# Patient Record
Sex: FEMALE | Race: WHITE | NOT HISPANIC OR LATINO | Employment: OTHER | URBAN - METROPOLITAN AREA
[De-identification: names, ages, dates, MRNs, and addresses within clinical notes are randomized per-mention and may not be internally consistent; named-entity substitution may affect disease eponyms.]

---

## 2020-01-15 ENCOUNTER — TELEPHONE (OUTPATIENT)
Dept: UROLOGY | Facility: CLINIC | Age: 60
End: 2020-01-15

## 2020-01-15 NOTE — TELEPHONE ENCOUNTER
appt moved up due to kidney stone and pain. Informed to go to [facility] if anything happens between now and then. HM

## 2020-01-15 NOTE — TELEPHONE ENCOUNTER
----- Message from Sandy Mike sent at 1/15/2020 10:30 AM CST -----  Contact: pt-  Caller is requesting a call back in regards to the nurse pt is a new pt has some questions about Kidney stones. Please call back [phone]

## 2020-01-27 ENCOUNTER — OFFICE VISIT (OUTPATIENT)
Dept: UROLOGY | Facility: CLINIC | Age: 60
End: 2020-01-27
Payer: COMMERCIAL

## 2020-01-27 VITALS
SYSTOLIC BLOOD PRESSURE: 126 MMHG | BODY MASS INDEX: 25.11 KG/M2 | DIASTOLIC BLOOD PRESSURE: 76 MMHG | HEART RATE: 103 BPM | RESPIRATION RATE: 12 BRPM | WEIGHT: 160 LBS | HEIGHT: 67 IN

## 2020-01-27 DIAGNOSIS — N20.1 URETEROLITHIASIS: Primary | ICD-10-CM

## 2020-01-27 PROCEDURE — 3008F BODY MASS INDEX DOCD: CPT | Mod: CPTII,S$GLB,, | Performed by: SPECIALIST

## 2020-01-27 PROCEDURE — 99204 OFFICE O/P NEW MOD 45 MIN: CPT | Mod: S$GLB,,, | Performed by: SPECIALIST

## 2020-01-27 PROCEDURE — 99204 PR OFFICE/OUTPT VISIT, NEW, LEVL IV, 45-59 MIN: ICD-10-PCS | Mod: S$GLB,,, | Performed by: SPECIALIST

## 2020-01-27 PROCEDURE — 3008F PR BODY MASS INDEX (BMI) DOCUMENTED: ICD-10-PCS | Mod: CPTII,S$GLB,, | Performed by: SPECIALIST

## 2020-01-28 ENCOUNTER — OUTSIDE PLACE OF SERVICE (OUTPATIENT)
Dept: UROLOGY | Facility: CLINIC | Age: 60
End: 2020-01-28
Payer: COMMERCIAL

## 2020-01-28 DIAGNOSIS — Z46.6 ENCOUNTER FOR REMOVAL OF URETERAL STENT: Primary | ICD-10-CM

## 2020-01-28 LAB
APPEARANCE, UA: CLEAR
APTT PPP: 33.1 SEC (ref 23.6–36.4)
BACTERIA SPEC CULT: ABNORMAL /HPF
BILIRUB UR QL STRIP: NEGATIVE MG/DL
BILIRUB UR QL STRIP: POSITIVE
BUN SERPL-MCNC: 21 MG/DL (ref 7–18)
BUN/CREAT SERPL: 21.42 RATIO (ref 7–18)
CALCIUM OXALATE CRYSTALS, UA: ABNORMAL /LPF
CALCIUM SERPL-MCNC: 9.5 MG/DL (ref 8.8–10.5)
CHLORIDE SERPL-SCNC: 103 MMOL/L (ref 100–108)
CO2 SERPL-SCNC: 29 MMOL/L (ref 21–32)
COLOR UR: ABNORMAL
CREAT SERPL-MCNC: 0.98 MG/DL (ref 0.55–1.02)
ERYTHROCYTE [DISTWIDTH] IN BLOOD BY AUTOMATED COUNT: 12.1 % (ref 12.5–18)
GFR ESTIMATION: 58
GLUCOSE (UA): NORMAL MG/DL
GLUCOSE SERPL-MCNC: 111 MG/DL (ref 70–110)
GLUCOSE UR QL STRIP: NEGATIVE
HCT VFR BLD AUTO: 39.6 % (ref 37–47)
HGB BLD-MCNC: 13.3 G/DL (ref 12–16)
HGB UR QL STRIP: 150 /UL
INR PPP: 1 INR (ref 0.9–1.1)
KETONES UR QL STRIP: ABNORMAL MG/DL
KETONES UR QL STRIP: POSITIVE
LEUKOCYTE ESTERASE UR QL STRIP: 100 /UL
LEUKOCYTE ESTERASE UR QL STRIP: POSITIVE
MANUAL NRBC PER 100 CELLS: 0 %
MCH RBC QN AUTO: 32.6 PG (ref 27–31.2)
MCHC RBC AUTO-ENTMCNC: 33.6 G/DL (ref 31.8–35.4)
MCV RBC AUTO: 97.1 FL (ref 80–97)
MUCUS URINE: ABNORMAL /LPF
NITRITE UR QL STRIP: NEGATIVE
PH UR STRIP: 5 PH (ref 5–9)
PH, POC UA: 7
PLATELETS: 460 10*3/UL (ref 142–424)
POC AMORP, URINE: ABNORMAL
POC BACTI, URINE: ABNORMAL
POC BLOOD, URINE: POSITIVE
POC CASTS, URINE: ABNORMAL
POC CRYST, URINE: ABNORMAL
POC EPITH, URINE: ABNORMAL
POC HCG, URINE: ABNORMAL
POC HYALIN, URINE: ABNORMAL
POC MUCUS, URINE: ABNORMAL
POC NITRATES, URINE: NEGATIVE
POC OTHER, URINE: ABNORMAL
POC RBC, URINE: ABNORMAL HPF
POC WBC, URINE: ABNORMAL HPF
POTASSIUM SERPL-SCNC: 3.9 MMOL/L (ref 3.6–5.2)
PROT UR QL STRIP: ABNORMAL MG/DL
PROT UR QL STRIP: NEGATIVE
PROTHROMBIN TIME: 11.6 SEC (ref 10.6–13)
RBC # BLD AUTO: 4.08 10*6/UL (ref 4.2–5.4)
RBC #/AREA URNS HPF: ABNORMAL /HPF (ref 0–2)
SERVICE COMMENT 03: ABNORMAL
SODIUM BLD-SCNC: 142 MMOL/L (ref 135–145)
SP GR UR STRIP: 1.01 (ref 1–1.03)
SP GR UR STRIP: 1.02 (ref 1–1.03)
SPECIMEN COLLECTION METHOD, URINE: ABNORMAL
SQUAMOUS EPITHELIAL, UA: ABNORMAL /LPF
UROBILINOGEN UR STRIP-ACNC: 1 (ref 0.1–1.1)
UROBILINOGEN UR STRIP-ACNC: NORMAL MG/DL
WBC # BLD: 8.4 10*3/UL (ref 4.6–10.2)
WBC #/AREA URNS HPF: ABNORMAL /HPF (ref 0–5)

## 2020-01-28 PROCEDURE — 52356 CYSTO/URETERO W/LITHOTRIPSY: CPT | Mod: LT,,, | Performed by: SPECIALIST

## 2020-01-28 PROCEDURE — 74420 UROGRAPHY RTRGR +-KUB: CPT | Mod: 26,,, | Performed by: SPECIALIST

## 2020-01-28 PROCEDURE — 74420 PR  X-RAY RETROGRADE PYELOGRAM: ICD-10-PCS | Mod: 26,,, | Performed by: SPECIALIST

## 2020-01-28 PROCEDURE — 52356 PR CYSTO/URETERO W/LITHOTRIPSY: ICD-10-PCS | Mod: LT,,, | Performed by: SPECIALIST

## 2020-01-28 NOTE — PROGRESS NOTES
Subjective:       Patient ID: Anna Flores is a 59 y.o. female.    Chief Complaint: Nephrolithiasis (was seen at [facility] ED/ct done/new pt) and Other (co abd low left pain, nausea/unable to update medications)      HPI:  59-year-old female who was recently diagnosed with an obstructing left distal ureteral stone    This patient was in her usual state of health and she started experiencing left flank pain.  Described the pain as a twisting ache with occasional pins and needles component to it it is intermittent but when he comes on is really severe scale of 1-10 about a 7 that prompted her to seek care at a local emergency department.  A CT scan was performed that confirmed a 4-5 mm stone in left distal ureter.  She was discharged on medical therapy and has not passed the stone as the pain comes on intermittently.    Associated symptoms include some nausea 1 episode of emesis prior to going to the emergency department all of that has seemed to resolve now denies any blood in the urine her denies any fevers or chills or dysuria.    She has a history of diverticulitis so she was imaged in the emergency department with IV contrast looking for any acute flare in her diverticulitis which was not the case.  She was referred to us for further management.    Past Medical History:   Past Medical History:   Diagnosis Date    Chronic constipation     Diverticulosis     GERD (gastroesophageal reflux disease)     IBS (irritable bowel syndrome)     Kidney stone     Thyroid disease        Past Surgical Historical:   Past Surgical History:   Procedure Laterality Date     SECTION      CHOLECYSTECTOMY      HYSTERECTOMY      joint fusion in back      RETINAL DETACHMENT REPAIR W/ SCLERAL BUCKLE LE      ROTATOR CUFF REPAIR      bi lat    THYROID SURGERY      TONSILLECTOMY, ADENOIDECTOMY          Medications:      Past Social History:   Social History     Socioeconomic History    Marital status:      Spouse  name: Not on file    Number of children: Not on file    Years of education: Not on file    Highest education level: Not on file   Occupational History    Not on file   Social Needs    Financial resource strain: Not on file    Food insecurity:     Worry: Not on file     Inability: Not on file    Transportation needs:     Medical: Not on file     Non-medical: Not on file   Tobacco Use    Smoking status: Current Every Day Smoker   Substance and Sexual Activity    Alcohol use: Yes     Frequency: Monthly or less    Drug use: Not on file    Sexual activity: Not Currently   Lifestyle    Physical activity:     Days per week: Not on file     Minutes per session: Not on file    Stress: Not on file   Relationships    Social connections:     Talks on phone: Not on file     Gets together: Not on file     Attends [employer] service: Not on file     Active member of club or organization: Not on file     Attends meetings of clubs or organizations: Not on file     Relationship status: Not on file   Other Topics Concern    Not on file   Social History Narrative    Not on file       Allergies: Review of patient's allergies indicates:  No Known Allergies     Family History: History reviewed. No pertinent family history.     Review of Systems:  Review of Systems - General ROS: negative  Psychological ROS: negative  Ophthalmic ROS: negative  ENT ROS: negative  Allergy and Immunology ROS: negative  Hematological and Lymphatic ROS: negative  Endocrine ROS: negative  Respiratory ROS: no cough, shortness of breath, or wheezing  Cardiovascular ROS: no chest pain or dyspnea on exertion  Gastrointestinal ROS: no abdominal pain, change in bowel habits, or black or bloody stools  Genito-Urinary ROS: positive for - left-sided flank pain  Musculoskeletal ROS: negative  Neurological ROS: no TIA or stroke symptoms  Dermatological ROS: negative     Physical Exam:  General Appearance:    Alert, cooperative, no distress, appears stated  age   Head:    Normocephalic, without obvious abnormality, atraumatic   Eyes:    PERRL, conjunctiva/corneas clear, EOM's intact, fundi     benign, both eyes   Ears:    Normal TM's and external ear canals, both ears   Nose:   Nares normal, septum midline, mucosa normal, no drainage    or sinus tenderness   Throat:   Lips, mucosa, and tongue normal; teeth and gums normal   Neck:   Supple, symmetrical, trachea midline, no adenopathy;     thyroid:  no enlargement/tenderness/nodules; no carotid    bruit or JVD   Back:     Symmetric, no curvature, ROM normal, no CVA tenderness   Lungs:     Clear to auscultation bilaterally, respirations unlabored   Chest Wall:    No tenderness or deformity    Heart:    Regular rate and rhythm, S1 and S2 normal, no murmur, rub   or gallop   Breast Exam:    No tenderness, masses, or nipple abnormality   Abdomen:     Soft, non-tender, bowel sounds active all four quadrants,     no masses, no organomegaly, there is discomfort with deep palpation in the left lower quadrant.  No guarding no rebound tenderness.   Genitalia:    Deferred   Rectal:    Deferred   Extremities:   Extremities normal, atraumatic, no cyanosis or edema   Pulses:   2+ and symmetric all extremities   Skin:   Skin color, texture, turgor normal, no rashes or lesions   Lymph nodes:   Cervical, supraclavicular, and axillary nodes normal   Neurologic:   CNII-XII intact, normal strength, sensation and reflexes     throughout         Assessment/Plan:       59-year-old female with a newly diagnosed obstructing stone in the left distal ureter.  Patient is clearly very symptomatic.    1.  We talked about treatment alternatives including continued medical expectant therapy which he is currently undergoing.  Other options would include ureteroscopy with stone extraction.  Patient has elected to undergo ureteroscopy with stone extraction which will be done tomorrow 01/28/2020.  He has  2.  Details of the procedure were discussed with  the patient's perioperative expectations were set.  Informed consent was obtained in the clinic today    Problem List Items Addressed This Visit        Renal/    Ureterolithiasis - Primary    Relevant Orders    POCT Urinalysis (w/Micro Option)    Ambulatory Referral to External Surgery

## 2020-02-02 LAB
CALCULI COMPOSITION: NORMAL
CALCULI DESCRIPTION: NORMAL
CALCULI MASS: 36 MG
CALCULI NUMBER: 5
CALCULI PDF: NORMAL
CALCULI SIZE: NORMAL MM

## 2020-02-07 ENCOUNTER — PROCEDURE VISIT (OUTPATIENT)
Dept: UROLOGY | Facility: CLINIC | Age: 60
End: 2020-02-07
Payer: COMMERCIAL

## 2020-02-07 VITALS
RESPIRATION RATE: 18 BRPM | HEIGHT: 67 IN | OXYGEN SATURATION: 100 % | BODY MASS INDEX: 25.43 KG/M2 | HEART RATE: 71 BPM | WEIGHT: 162 LBS | DIASTOLIC BLOOD PRESSURE: 73 MMHG | SYSTOLIC BLOOD PRESSURE: 121 MMHG

## 2020-02-07 DIAGNOSIS — Z46.6 ENCOUNTER FOR REMOVAL OF URETERAL STENT: Primary | ICD-10-CM

## 2020-02-07 DIAGNOSIS — N20.1 URETEROLITHIASIS: ICD-10-CM

## 2020-02-07 PROCEDURE — 52310 CYSTOSCOPY: ICD-10-PCS | Mod: S$GLB,,, | Performed by: SPECIALIST

## 2020-02-07 PROCEDURE — 52310 CYSTOSCOPY AND TREATMENT: CPT | Mod: S$GLB,,, | Performed by: SPECIALIST

## 2020-02-07 RX ORDER — DIAZEPAM 10 MG/1
10 TABLET ORAL
Status: COMPLETED | OUTPATIENT
Start: 2020-02-07 | End: 2020-02-07

## 2020-02-07 RX ADMIN — DIAZEPAM 10 MG: 10 TABLET ORAL at 10:02

## 2020-02-07 NOTE — PATIENT INSTRUCTIONS

## 2020-02-07 NOTE — PROCEDURES
"Cystoscopy  Date/Time: 2/7/2020 11:00 AM  Performed by: Guero Ortiz MD  Authorized by: Guero Ortiz MD     Consent Done?:  Yes (Written)  Time out: Immediately prior to procedure a "time out" was called to verify the correct patient, procedure, equipment, support staff and site/side marked as required.    Position:  Dorsal lithotomy  Anesthesia:  Intraurethral instillation  Preparation: Patient was prepped and draped in usual sterile fashion      Scope type:  Rigid cystoscope  Stent removed: Yes    Stent encrusted: No    External exam normal: Yes    Urethra normal: Yes  Bladder neck normal: Bladder neck normal   Bladder normal: Yes      Patient tolerance:  Patient tolerated the procedure well with no immediate complications     Stent successfully removed patient tolerated procedure very well.    Return to clinic in 6-8 weeks for stone analysis discussion.      "

## 2023-10-31 ENCOUNTER — TELEPHONE (OUTPATIENT)
Dept: GASTROENTEROLOGY | Facility: CLINIC | Age: 63
End: 2023-10-31
Payer: COMMERCIAL

## 2023-10-31 DIAGNOSIS — K58.9 IBS (IRRITABLE BOWEL SYNDROME): ICD-10-CM

## 2023-10-31 DIAGNOSIS — K57.90 DIVERTICULOSIS: Primary | ICD-10-CM

## 2023-10-31 DIAGNOSIS — R10.9 ABDOMINAL PAIN: ICD-10-CM

## 2023-10-31 NOTE — TELEPHONE ENCOUNTER
Per referral patient is seeing gastroenterologist in [place].  Okay to schedule next available office visit with NP.  BRENNEN

## 2023-11-06 ENCOUNTER — OFFICE VISIT (OUTPATIENT)
Dept: GASTROENTEROLOGY | Facility: CLINIC | Age: 63
End: 2023-11-06
Payer: COMMERCIAL

## 2023-11-06 VITALS
BODY MASS INDEX: 26.84 KG/M2 | HEIGHT: 67 IN | OXYGEN SATURATION: 97 % | WEIGHT: 171 LBS | SYSTOLIC BLOOD PRESSURE: 120 MMHG | DIASTOLIC BLOOD PRESSURE: 79 MMHG | HEART RATE: 86 BPM

## 2023-11-06 DIAGNOSIS — R10.9 ABDOMINAL PAIN: ICD-10-CM

## 2023-11-06 DIAGNOSIS — K58.1 IRRITABLE BOWEL SYNDROME WITH CONSTIPATION: ICD-10-CM

## 2023-11-06 DIAGNOSIS — K58.9 IBS (IRRITABLE BOWEL SYNDROME): ICD-10-CM

## 2023-11-06 DIAGNOSIS — R10.13 EPIGASTRIC PAIN: Primary | ICD-10-CM

## 2023-11-06 DIAGNOSIS — K76.0 FATTY LIVER: ICD-10-CM

## 2023-11-06 DIAGNOSIS — K31.A0 GASTRIC INTESTINAL METAPLASIA: ICD-10-CM

## 2023-11-06 DIAGNOSIS — K21.9 GASTROESOPHAGEAL REFLUX DISEASE, UNSPECIFIED WHETHER ESOPHAGITIS PRESENT: ICD-10-CM

## 2023-11-06 DIAGNOSIS — R14.0 ABDOMINAL BLOATING: ICD-10-CM

## 2023-11-06 DIAGNOSIS — K57.90 DIVERTICULOSIS: ICD-10-CM

## 2023-11-06 PROCEDURE — 1159F MED LIST DOCD IN RCRD: CPT | Mod: CPTII,S$GLB,,

## 2023-11-06 PROCEDURE — 99205 OFFICE O/P NEW HI 60 MIN: CPT | Mod: S$GLB,,,

## 2023-11-06 PROCEDURE — 99205 PR OFFICE/OUTPT VISIT, NEW, LEVL V, 60-74 MIN: ICD-10-PCS | Mod: S$GLB,,,

## 2023-11-06 PROCEDURE — 1160F RVW MEDS BY RX/DR IN RCRD: CPT | Mod: CPTII,S$GLB,,

## 2023-11-06 PROCEDURE — 1160F PR REVIEW ALL MEDS BY PRESCRIBER/CLIN PHARMACIST DOCUMENTED: ICD-10-PCS | Mod: CPTII,S$GLB,,

## 2023-11-06 PROCEDURE — 3074F PR MOST RECENT SYSTOLIC BLOOD PRESSURE < 130 MM HG: ICD-10-PCS | Mod: CPTII,S$GLB,,

## 2023-11-06 PROCEDURE — 3078F PR MOST RECENT DIASTOLIC BLOOD PRESSURE < 80 MM HG: ICD-10-PCS | Mod: CPTII,S$GLB,,

## 2023-11-06 PROCEDURE — 3074F SYST BP LT 130 MM HG: CPT | Mod: CPTII,S$GLB,,

## 2023-11-06 PROCEDURE — 3008F BODY MASS INDEX DOCD: CPT | Mod: CPTII,S$GLB,,

## 2023-11-06 PROCEDURE — 3008F PR BODY MASS INDEX (BMI) DOCUMENTED: ICD-10-PCS | Mod: CPTII,S$GLB,,

## 2023-11-06 PROCEDURE — 3078F DIAST BP <80 MM HG: CPT | Mod: CPTII,S$GLB,,

## 2023-11-06 PROCEDURE — 1159F PR MEDICATION LIST DOCUMENTED IN MEDICAL RECORD: ICD-10-PCS | Mod: CPTII,S$GLB,,

## 2023-11-06 RX ORDER — METFORMIN HYDROCHLORIDE 500 MG/1
TABLET ORAL
COMMUNITY
Start: 2023-10-18

## 2023-11-06 RX ORDER — PANTOPRAZOLE SODIUM 40 MG/1
40 TABLET, DELAYED RELEASE ORAL 2 TIMES DAILY
Qty: 180 TABLET | Refills: 1 | Status: SHIPPED | OUTPATIENT
Start: 2023-11-06

## 2023-11-06 RX ORDER — OMEPRAZOLE 40 MG/1
40 CAPSULE, DELAYED RELEASE ORAL DAILY
COMMUNITY

## 2023-11-06 RX ORDER — METHYLPHENIDATE HYDROCHLORIDE 18 MG/1
TABLET ORAL
COMMUNITY
Start: 2023-09-19

## 2023-11-06 RX ORDER — BUSPIRONE HYDROCHLORIDE 15 MG/1
TABLET ORAL
COMMUNITY
Start: 2023-10-12

## 2023-11-06 RX ORDER — LOSARTAN POTASSIUM AND HYDROCHLOROTHIAZIDE 25; 100 MG/1; MG/1
TABLET ORAL
COMMUNITY
Start: 2023-10-11

## 2023-11-06 RX ORDER — LINACLOTIDE 145 UG/1
CAPSULE, GELATIN COATED ORAL
COMMUNITY
Start: 2023-10-10

## 2023-11-06 NOTE — PATIENT INSTRUCTIONS
Schedule upper endoscopy with Dr. Greenwood.   Begin taking pantoprazole 40 mg twice a day.   Begin taking digestive or pancreatic enzymes over the counter.     Please notify my office if you have not been contacted within two weeks after any procedures, submitting any samples (biopsies, blood, stool, urine, etc.) or after any imaging (X-ray, CT, MRI, etc.).

## 2023-11-06 NOTE — PROGRESS NOTES
Clinic Note    Reason for visit:  The primary encounter diagnosis was Epigastric pain. Diagnoses of Abdominal bloating, Gastric intestinal metaplasia, Gastroesophageal reflux disease, unspecified whether esophagitis present, Diverticulosis, Irritable bowel syndrome with constipation, and Fatty liver were also pertinent to this visit.    PCP: Denise Aguirre   [address] / [place] [id number]    HPI:  This is a 63 y.o. female who was last seen by Dr. Greenwood 10/2020. Patient with IBS-C and GERD. She reports epigastric pain that is sharp and radiates down abdomen. Worse after eating. She has to eat small portions. If has normal meal, distention will cause pain to be worse. Laying flat helps pain. Bending forward and sitting may make pain worse. She has IBS-C controlled with Linzess 145 mcg daily and has daily BM. She will take MiraLAX if needed. No blood in stool. Has a lot of bloating/gas. She is taking omeprazole 40 mg daily. Denies dysphagia. She does take Aleve almost nightly for back pain.      Last colonoscopy ?2/2022 with Dr. Patterson and told to repeat in 5 yr  Also had EGD with Dr. Patterson and told had a lot of scar tissue.     Labs 10/12/2023: Cr 0.97H, Ca 10.3H, CMP onl, Lipase wnl, PLT 435H, CBC onl  Labs 8/2023: TSH/CMP/CBC wnl, triglycerides 412H    10/17/2023 RUQ US: Fatty liver, left hepatic cyst. No GB. CBD wnl  7/2021 Abd US: fatty liver, no GB  2019 CTE: hypoenh area in liver (stable from 2010), non-obs kidney stones, tics.    EGD 2/7/2019: Small HH, ABx, distal/prox BBx, FBx wnl w/o IM    EGD/Colonoscopy 10/25/2018: Small HH, Ant/Bodx2/Fund/Card Bx wnl w/o Hp/IM, L/M EBx reflux. Severe diverticulosis of the sigmoid colon..     Records: 2010 erosive esophagitis, antritis, anal fissure. 6/2017 CT AP IV/PO: s/p jimmy/hyst, GI tract nl, 2 small left intrarenal stones. 2014 esophagitis, HH, FGP. DBx nl, GBx reactive with IM, B/FBx nl w/microcystic change, EBx reflux, FGP.    Review of Systems    Constitutional:  Negative for fatigue, fever and unexpected weight change.   HENT:  Negative for mouth sores, postnasal drip, sore throat and trouble swallowing.    Eyes:  Negative for pain, discharge and eye dryness.   Respiratory:  Negative for apnea, cough, choking, chest tightness, shortness of breath and wheezing.    Cardiovascular:  Negative for chest pain, palpitations and leg swelling.   Gastrointestinal:  Positive for abdominal distention, abdominal pain and reflux. Negative for anal bleeding, blood in stool, change in bowel habit, constipation, diarrhea, nausea, rectal pain, vomiting and fecal incontinence.   Genitourinary:  Negative for bladder incontinence, dysuria and hematuria.   Musculoskeletal:  Negative for arthralgias, back pain and joint swelling.   Integumentary:  Negative for color change and rash.   Allergic/Immunologic: Negative for environmental allergies and food allergies.   Neurological:  Negative for seizures and headaches.   Hematological:  Negative for adenopathy. Does not bruise/bleed easily.        Past Medical History:   Diagnosis Date    ADHD     Anxiety disorder, unspecified     Chronic constipation     Diverticulosis     GERD (gastroesophageal reflux disease)     IBS (irritable bowel syndrome)     Insomnia     Kidney stone     Thyroid disease      Past Surgical History:   Procedure Laterality Date     SECTION      CHOLECYSTECTOMY      HYSTERECTOMY      joint fusion in back      RETINAL DETACHMENT REPAIR W/ SCLERAL BUCKLE LE      ROTATOR CUFF REPAIR      bi lat    THYROID SURGERY      TONSILLECTOMY, ADENOIDECTOMY      TUBAL LIGATION      Tubal Reversal       History reviewed. No pertinent family history.  Social History     Tobacco Use    Smoking status: Former     Types: Cigarettes, Vaping with nicotine    Smokeless tobacco: Never   Substance Use Topics    Alcohol use: Yes     Comment: seldom / maybe twice a month    Drug use: Never     Review of patient's allergies  "indicates:  No Known Allergies   Medication List with Changes/Refills   New Medications    PANTOPRAZOLE (PROTONIX) 40 MG TABLET    Take 1 tablet (40 mg total) by mouth 2 (two) times daily.   Current Medications    BUSPIRONE (BUSPAR) 15 MG TABLET        FENOFIBRIC ACID (FIBRICOR) 135 MG CPDR        LINZESS 145 MCG CAP CAPSULE        LOSARTAN-HYDROCHLOROTHIAZIDE 100-25 MG (HYZAAR) 100-25 MG PER TABLET        METFORMIN (GLUCOPHAGE) 500 MG TABLET        METHYLPHENIDATE HCL 18 MG CR TABLET        OMEPRAZOLE (PRILOSEC) 40 MG CAPSULE    Take 40 mg by mouth once daily.    ROSUVASTATIN (CRESTOR) 20 MG TABLET    40 mg.    SYNTHROID 150 MCG TABLET    Take 0.175 mcg by mouth before breakfast.   Discontinued Medications    AMITIZA 8 MCG CAP        CHLORDIAZEPOXIDE-CLIDINIUM 5-2.5 MG (LIBRAX) 5-2.5 MG CAP        DEXTROAMPHETAMINE-AMPHETAMINE (ADDERALL XR) 30 MG 24 HR CAPSULE        DIAZEPAM (VALIUM) 2 MG TABLET        HYDROCODONE-ACETAMINOPHEN (NORCO) 5-325 MG PER TABLET        HYOSCYAMINE (LEVSIN/SL) 0.125 MG SUBL        PANTOPRAZOLE (PROTONIX) 40 MG TABLET        PAROXETINE (PAXIL) 40 MG TABLET        TAMSULOSIN (FLOMAX) 0.4 MG CAP        TOPIRAMATE (TOPAMAX) 50 MG TABLET        TRULANCE 3 MG TAB             Vital Signs:  /79   Pulse 86   Ht 5' 7" (1.702 m)   Wt 77.6 kg (171 lb)   SpO2 97%   BMI 26.78 kg/m²        Physical Exam  Vitals reviewed.   Constitutional:       General: She is awake. She is not in acute distress.     Appearance: Normal appearance. She is well-developed. She is not ill-appearing, toxic-appearing or diaphoretic.   HENT:      Head: Normocephalic and atraumatic.      Nose: Nose normal.      Mouth/Throat:      Mouth: Mucous membranes are moist.      Pharynx: Oropharynx is clear. No oropharyngeal exudate or posterior oropharyngeal erythema.   Eyes:      General: Lids are normal. Gaze aligned appropriately. No scleral icterus.        Right eye: No discharge.         Left eye: No discharge.      " Conjunctiva/sclera: Conjunctivae normal.   Neck:      Trachea: Trachea normal.   Cardiovascular:      Rate and Rhythm: Normal rate and regular rhythm.      Pulses:           Radial pulses are 2+ on the right side and 2+ on the left side.   Pulmonary:      Effort: Pulmonary effort is normal. No respiratory distress.      Breath sounds: No stridor. No wheezing.   Chest:      Chest wall: No tenderness.   Abdominal:      General: Bowel sounds are normal. There is distension (air).      Palpations: Abdomen is soft. There is no fluid wave, hepatomegaly or mass.      Tenderness: There is abdominal tenderness in the epigastric area. There is no guarding or rebound.   Musculoskeletal:         General: No tenderness or deformity.      Cervical back: Full passive range of motion without pain and neck supple. No tenderness.      Right lower leg: No edema.      Left lower leg: No edema.   Lymphadenopathy:      Cervical: No cervical adenopathy.   Skin:     General: Skin is warm and dry.      Capillary Refill: Capillary refill takes less than 2 seconds.      Coloration: Skin is not cyanotic, jaundiced or pale.   Neurological:      General: No focal deficit present.      Mental Status: She is alert and oriented to person, place, and time.      Motor: No tremor.   Psychiatric:         Attention and Perception: Attention normal.         Mood and Affect: Mood and affect normal.         Speech: Speech normal.         Behavior: Behavior normal. Behavior is cooperative.            All of the data above and below has been reviewed by myself and any further interpretations will be reflected in the assessment and plan.   The data includes review of external notes, and independent interpretation of lab results, procedures, x-rays, and imaging reports.      Assessment:  Epigastric pain  -     Ambulatory referral/consult to Gastroenterology  -     Ambulatory Referral to External Surgery    Abdominal bloating    Gastric intestinal metaplasia  -      Ambulatory Referral to External Surgery    Gastroesophageal reflux disease, unspecified whether esophagitis present    Diverticulosis  -     Ambulatory referral/consult to Gastroenterology    Irritable bowel syndrome with constipation  -     Ambulatory referral/consult to Gastroenterology    Fatty liver    Other orders  -     pantoprazole (PROTONIX) 40 MG tablet; Take 1 tablet (40 mg total) by mouth 2 (two) times daily.  Dispense: 180 tablet; Refill: 1    Gastric intestinal metaplasia: GBx well on 2/2019 EGD  Abdominal pain (epigastric): Adhesions v GERD v ulcer v IBS   Swollen abdomen: CT 2/2019 wnl, EGD with [facility]. KUB with stool. GES nl. Trial of digestive enzymes. Low FODMAP diet.  GERD: trial of panto 40 BID   IBS-C: Linzess 145 daily working well. Trulance caused receding gums.      Recommendations:  Schedule upper endoscopy with Dr. Greenwood.   Begin taking pantoprazole 40 mg twice a day.   Begin taking digestive enzymes over the counter.       Risks, benefits, and alternatives of medical management, any associated procedures, and/or treatment discussed with the patient. Patient given opportunity to ask questions and voices understanding. Patient has elected to proceed with the recommended care modalities as discussed.      Order summary:  Orders Placed This Encounter   Procedures    Ambulatory Referral to External Surgery        Instructed patient to notify my office if they have not been contacted within two weeks after any procedures, submitting any samples (biopsies, blood, stool, urine, etc.) or after any imaging (X-ray, CT, MRI, etc.).      Melinda Jones NP    This document may have been created using a voice recognition transcribing system. Incorrect words or phrases may have been missed during proofreading. Please interpret accordingly or contact me for clarification.

## 2023-11-07 NOTE — PROGRESS NOTES
CT AP IV 10/31/2023: 12 mm liver lesion w/ bilobed appearance. Fatty liver. No GB. Diverticula w/o diverticulitis.   10/17/2023 RUQ US: Fatty liver, left hepatic cyst bilobed vs 2 adjacent cysts. No GB. CBD wnl  MLC

## 2023-11-20 DIAGNOSIS — K58.9 IBS (IRRITABLE BOWEL SYNDROME): ICD-10-CM

## 2023-11-20 DIAGNOSIS — R10.9 ABDOMINAL PAIN: ICD-10-CM

## 2023-11-20 DIAGNOSIS — K57.90 DIVERTICULOSIS: Primary | ICD-10-CM

## 2024-02-22 ENCOUNTER — TELEPHONE (OUTPATIENT)
Dept: GASTROENTEROLOGY | Facility: CLINIC | Age: 64
End: 2024-02-22
Payer: COMMERCIAL

## 2024-02-22 VITALS — WEIGHT: 171 LBS | HEIGHT: 67 IN | BODY MASS INDEX: 26.84 KG/M2

## 2024-02-22 DIAGNOSIS — K31.A0 GASTRIC INTESTINAL METAPLASIA: ICD-10-CM

## 2024-02-22 DIAGNOSIS — R10.13 EPIGASTRIC PAIN: Primary | ICD-10-CM

## 2024-02-22 NOTE — TELEPHONE ENCOUNTER
Called and left a detailed message that I was calling as a courtesy regarding her upcoming EGD with BRENNEN on 2/28/24, Tues and wanted to verify that she had paper prep instructions. I also mentioned that [facility] will call on Tues with the arrival time, GI lab is located on third floor, and pre-register this week. debby

## 2024-02-22 NOTE — TELEPHONE ENCOUNTER
"[facility]  [address] [place] [address]  Phone: [phone]  Fax: [phone]    History & Physical         Provider: Dr. Nuha Greenwood    Patient Name: Anna MARCANO (age):1960  63 y.o.           Gender: female   Phone: [phone]     Referring Physician: Denise Aguirre     Vital Signs:   Height - 5' 7"  Weight - 171 lb  BMI -  26.78    Plan: EGD w/ gastric mapping @ [facility]    Encounter Diagnoses   Name Primary?    Epigastric pain Yes    Gastric intestinal metaplasia            History:      Past Medical History:   Diagnosis Date    ADHD     Anxiety disorder, unspecified     Chronic constipation     Diverticulosis     GERD (gastroesophageal reflux disease)     IBS (irritable bowel syndrome)     Insomnia     Kidney stone     Thyroid disease       Past Surgical History:   Procedure Laterality Date     SECTION      CHOLECYSTECTOMY      HYSTERECTOMY      joint fusion in back      RETINAL DETACHMENT REPAIR W/ SCLERAL BUCKLE LE      ROTATOR CUFF REPAIR      bi lat    THYROID SURGERY      TONSILLECTOMY, ADENOIDECTOMY      TUBAL LIGATION      Tubal Reversal        Medication List with Changes/Refills   Current Medications    BUSPIRONE (BUSPAR) 15 MG TABLET        FENOFIBRIC ACID (FIBRICOR) 135 MG CPDR        LINZESS 145 MCG CAP CAPSULE        LOSARTAN-HYDROCHLOROTHIAZIDE 100-25 MG (HYZAAR) 100-25 MG PER TABLET        METFORMIN (GLUCOPHAGE) 500 MG TABLET        METHYLPHENIDATE HCL 18 MG CR TABLET        OMEPRAZOLE (PRILOSEC) 40 MG CAPSULE    Take 40 mg by mouth once daily.    PANTOPRAZOLE (PROTONIX) 40 MG TABLET    Take 1 tablet (40 mg total) by mouth 2 (two) times daily.    ROSUVASTATIN (CRESTOR) 20 MG TABLET    40 mg.    SYNTHROID 150 MCG TABLET    Take 0.175 mcg by mouth before breakfast.      Review of patient's allergies indicates:  No Known Allergies   No family history on file. "   Social History     Tobacco Use    Smoking status: Former     Types: Cigarettes, Vaping with nicotine    Smokeless tobacco: Never   Substance Use Topics    Alcohol use: Yes     Comment: seldom / maybe twice a month    Drug use: Never        Physical Examination:     General Appearance:___________________________  HEENT: _____________________________________  Abdomen:____________________________________  Heart:________________________________________  Lungs:_______________________________________  Extremities:___________________________________  Skin:_________________________________________  Endocrine:____________________________________  Genitourinary:_________________________________  Neurological:__________________________________      Patient has been evaluated immediately prior to sedation and is medically cleared for endoscopy with IVCS as an ASA class: ______      Physician Signature: _________________________       Date: ________  Time: ________

## 2024-02-28 ENCOUNTER — OUTSIDE PLACE OF SERVICE (OUTPATIENT)
Dept: GASTROENTEROLOGY | Facility: CLINIC | Age: 64
End: 2024-02-28

## 2024-02-28 PROCEDURE — 43239 EGD BIOPSY SINGLE/MULTIPLE: CPT | Mod: ,,, | Performed by: INTERNAL MEDICINE

## 2024-03-05 ENCOUNTER — TELEPHONE (OUTPATIENT)
Dept: GASTROENTEROLOGY | Facility: CLINIC | Age: 64
End: 2024-03-05
Payer: COMMERCIAL

## 2024-03-05 DIAGNOSIS — K29.80 DUODENITIS DETERMINED BY BIOPSY: Primary | ICD-10-CM

## 2024-03-05 NOTE — TELEPHONE ENCOUNTER
DBx focal villi blunting, gastric mapping: nl, EGBx chr act gitis w/o Hp.    Notify patient. No signs of infection or precancerous cells on the upper endoscopy biopsies.  She has minimal infl of her small bowel that can be from OTC NSAIDs. Rec blood tests to confirm no celiac disease. Orders signed.  NBP

## 2024-03-07 NOTE — TELEPHONE ENCOUNTER
Results discussed with patient per providers chart remarks. Patient voices understanding/agreement. Results sent to PCP.  Recall verified/updated. Pt requested lab orders be sent to [facility] draw station. Orders faxed.  -Cori ZHENG CMA

## 2024-03-14 LAB
ADDITIONAL TESTING: NORMAL
GLIADIN AB IGA, DEAMINATED: 0.5 U/ML
GLIADIN AB IGG, DEAMINATED: <0.6 U/ML
IGA SERPL-MCNC: 116 MG/DL (ref 70–400)
TTG IGA: <0.2 U/ML

## 2024-03-18 ENCOUNTER — TELEPHONE (OUTPATIENT)
Dept: GASTROENTEROLOGY | Facility: CLINIC | Age: 64
End: 2024-03-18

## 2024-03-18 NOTE — TELEPHONE ENCOUNTER
Patient is calling for results from her blood work. I did let patient know soon as we have them we will call her. 3/18/24 LRA       ----- Message from Ro Petersen sent at 3/18/2024  2:13 PM CDT -----  Contact: self  Type:  Test Results (requesting call back)    Who Called:  Anna Flores   Name of Test (Lab/Mammo/Etc):  Blood work @ [facility]  Date of Test: Last Tues or week before  Ordering Provider: Timo  Would the patient rather a call back or a response via [facility]? Call back   Best Call Back Number:  [phone]   Additional Information:  N/a

## 2024-03-26 LAB — ENDOMYSIAL IGA SCREEN: NEGATIVE

## 2024-03-28 ENCOUNTER — TELEPHONE (OUTPATIENT)
Dept: GASTROENTEROLOGY | Facility: CLINIC | Age: 64
End: 2024-03-28
Payer: COMMERCIAL

## 2024-03-28 NOTE — TELEPHONE ENCOUNTER
----- Message from Pippa Fleming sent at 3/28/2024 11:26 AM CDT -----  Contact: self  Type:  Patient Returning Call    Who Called:Anna Flores  Who Left Message for Patient:unsure  Does the patient know what this is regarding?:results  Would the patient rather a call back or a response via [facility]?   Best Call Back Number:[phone]  Additional Information: n/a

## 2024-03-29 ENCOUNTER — TELEPHONE (OUTPATIENT)
Dept: GASTROENTEROLOGY | Facility: CLINIC | Age: 64
End: 2024-03-29
Payer: COMMERCIAL

## 2024-04-01 NOTE — TELEPHONE ENCOUNTER
Message  Received: Today   Pt Advice, Results  Waleska Harley Staff  Caller: pt (Today,  9:20 AM)  Pt returning a missed call about test results and she can be reached [phone]    Thanks,

## 2024-04-11 RX ORDER — PANTOPRAZOLE SODIUM 40 MG/1
40 TABLET, DELAYED RELEASE ORAL 2 TIMES DAILY
Qty: 180 TABLET | Refills: 1 | Status: SHIPPED | OUTPATIENT
Start: 2024-04-11

## 2024-05-13 ENCOUNTER — TELEPHONE (OUTPATIENT)
Dept: GASTROENTEROLOGY | Facility: CLINIC | Age: 64
End: 2024-05-13
Payer: COMMERCIAL

## 2024-05-13 NOTE — TELEPHONE ENCOUNTER
----- Message from Pippa Fleming sent at 5/13/2024  9:26 AM CDT -----  Contact: self  Type:  Patient Returning Call    Who Called:Anna Flores  Who Left Message for Patient:unsure  Does the patient know what this is regarding?:pt needs to reschedule appt due to family emergency  Would the patient rather a call back or a response via [facility]?   Best Call Back Number:  Additional Information: n/a

## 2024-07-17 ENCOUNTER — TELEPHONE (OUTPATIENT)
Dept: GASTROENTEROLOGY | Facility: CLINIC | Age: 64
End: 2024-07-17
Payer: COMMERCIAL

## 2024-07-17 NOTE — TELEPHONE ENCOUNTER
----- Message from Christa Xavier sent at 7/16/2024  2:49 PM CDT -----  Patient is calling in regards to will need to scheduled colonoscopy procedure please call her back at [phone] (home) [phone] (work)

## 2024-07-17 NOTE — TELEPHONE ENCOUNTER
Spoke with patient. She said she thinks she had a flare up of diverticulitis. Has been in a lot of pain. Her PCP said it could be a possible obstruction. PCP ordered X-rays and patient is waiting for the results. Patient said she's been experiencing extreme pain and has to strain for bowel movements. She already takes Linzess and MiraLax. Patient thinks she needs to have a colonoscopy done soon. OV scheduled w/ [facility] on Thursday August 8, 2024 at 8:20 am. DMP